# Patient Record
Sex: FEMALE | Race: WHITE | Employment: PART TIME | URBAN - METROPOLITAN AREA
[De-identification: names, ages, dates, MRNs, and addresses within clinical notes are randomized per-mention and may not be internally consistent; named-entity substitution may affect disease eponyms.]

---

## 2017-02-08 ENCOUNTER — OFFICE VISIT (OUTPATIENT)
Dept: URGENT CARE | Facility: URGENT CARE | Age: 67
End: 2017-02-08
Payer: OTHER MISCELLANEOUS

## 2017-02-08 VITALS
SYSTOLIC BLOOD PRESSURE: 120 MMHG | WEIGHT: 146 LBS | DIASTOLIC BLOOD PRESSURE: 80 MMHG | OXYGEN SATURATION: 97 % | TEMPERATURE: 98 F | HEART RATE: 68 BPM

## 2017-02-08 DIAGNOSIS — S61.219A FINGER LACERATION, INITIAL ENCOUNTER: Primary | ICD-10-CM

## 2017-02-08 PROCEDURE — 12001 RPR S/N/AX/GEN/TRNK 2.5CM/<: CPT | Performed by: FAMILY MEDICINE

## 2017-02-08 RX ORDER — ASPIRIN 81 MG/1
81 TABLET, CHEWABLE ORAL DAILY
COMMUNITY

## 2017-02-08 RX ORDER — SIMVASTATIN 20 MG
20 TABLET ORAL AT BEDTIME
COMMUNITY

## 2017-02-08 NOTE — NURSING NOTE
Chief Complaint   Patient presents with     Laceration     laceration to rt thumb,sustained at work on a      Work Comp       Initial /80 mmHg  Pulse 68  Temp(Src) 98  F (36.7  C) (Oral)  Wt 146 lb (66.225 kg)  SpO2 97% There is no height on file to calculate BMI.  Medication Reconciliation: complete   Miles HAMPTON  Regular cuff

## 2017-02-08 NOTE — PROGRESS NOTES
SUBJECTIVE: @[email or web address]@.ident who presents to the clinic with a laceration on the finger sustained 1 hours(s) ago.    This is a non-work related and accidental injury.    Mechanism of injury: sheet metal.  Associated symptoms: Denies numbness, weakness, or loss of function  Last tetanus booster within 10 years: yes    No past medical history on file.  No Known Allergies  Social History   Substance Use Topics     Smoking status: Current Every Day Smoker -- 1.00 packs/day     Types: Cigarettes     Smokeless tobacco: Never Used     Alcohol Use: Not on file       ROS:  Neuro: good distal sensation  Motor: normal rom and strenght  Hem: capillary refill < 2 sec    EXAM: The patient appears today in alert,no apparent distress distressVITALS:   /80 mmHg  Pulse 68  Temp(Src) 98  F (36.7  C) (Oral)  Wt 146 lb (66.225 kg)  SpO2 97%  Size of laceration: 1 centimeters  Characteristics of the laceration: clean and flap type  Tendon function intact: yes  Sensation to light touch intact: yes  Pulses/Capillary refill intact: yes      ICD-10-CM    1. Finger laceration, initial encounter S61.219A REPAIR SUPERFICIAL, WOUND BODY < =2.5CM       Procedure Note:Wound injected with 1 cc's of Lidocaine 1% plain  Good anesthesia was obtained  Prepped and draped in the usual sterile fashion  Wound cleaned with sterile water  Wound soaked  Laceration was closed using 2 5-0 nylon interrupted sutures  After care instructions:Keep wound clean   Sutures out in 10 days  Signs of infection discussed today

## 2017-02-08 NOTE — MR AVS SNAPSHOT
"              After Visit Summary   2017    Kiara Che    MRN: [id number]           Patient Information     Date Of Birth          1950        Visit Information        Provider Department      2017 4:15 PM Mihai Quinn, DO [facility]        Today's Diagnoses     Finger laceration, initial encounter    -  1        Follow-ups after your visit        Who to contact     If you have questions or need follow up information about today's clinic visit or your schedule please contact [facility] directly at [phone].  Normal or non-critical lab and imaging results will be communicated to you by [employer], letter or phone within 4 business days after the clinic has received the results. If you do not hear from us within 7 days, please contact the clinic through [employer] or phone. If you have a critical or abnormal lab result, we will notify you by phone as soon as possible.  Submit refill requests through [employer] or call your pharmacy and they will forward the refill request to us. Please allow 3 business days for your refill to be completed.          Additional Information About Your Visit        MyChart Information     [employer] lets you send messages to your doctor, view your test results, renew your prescriptions, schedule appointments and more. To sign up, go to [email or web address] . Click on \"Log in\" on the left side of the screen, which will take you to the Welcome page. Then click on \"Sign up Now\" on the right side of the page.     You will be asked to enter the access code listed below, as well as some personal information. Please follow the directions to create your username and password.     Your access code is: [email or web address]  Expires: 2017  4:43 PM     Your access code will  in 90 days. If you need help or a new code, please call your [place] clinic or [phone].        Care EveryWhere ID     This is your Care EveryWhere " ID. This could be used by other organizations to access your [place] medical records  [id number]        Your Vitals Were     Pulse Temperature Pulse Oximetry             68 98  F (36.7  C) (Oral) 97%          Blood Pressure from Last 3 Encounters:   02/08/17 120/80    Weight from Last 3 Encounters:   02/08/17 146 lb (66.225 kg)              We Performed the Following     REPAIR SUPERFICIAL, WOUND BODY < =2.5CM        Primary Care Provider    None Specified       No primary provider on file.        Thank you!     Thank you for choosing [facility]  for your care. Our goal is always to provide you with excellent care. Hearing back from our patients is one way we can continue to improve our services. Please take a few minutes to complete the written survey that you may receive in the mail after your visit with us. Thank you!             Your Updated Medication List - Protect others around you: Learn how to safely use, store and throw away your medicines at www.disposemymeds.org.          This list is accurate as of: 2/8/17  4:43 PM.  Always use your most recent med list.                   Brand Name Dispense Instructions for use    aspirin 81 MG chewable tablet      Take 81 mg by mouth daily       PRIMIDONE PO          simvastatin 20 MG tablet    ZOCOR     Take 20 mg by mouth At Bedtime

## 2017-02-17 ENCOUNTER — OFFICE VISIT (OUTPATIENT)
Dept: URGENT CARE | Facility: URGENT CARE | Age: 67
End: 2017-02-17

## 2017-02-17 VITALS — TEMPERATURE: 97 F

## 2017-02-17 DIAGNOSIS — Z48.02 ENCOUNTER FOR REMOVAL OF SUTURES: Primary | ICD-10-CM

## 2017-02-17 PROCEDURE — 99207 ZZC NO CHARGE NURSE ONLY: CPT

## 2017-02-17 NOTE — MR AVS SNAPSHOT
"              After Visit Summary   2017    Kiara Che    MRN: [id number]           Patient Information     Date Of Birth          1950        Visit Information        Provider Department      2017 11:15 AM Provider, Ellie Petersen MD [facility]        Today's Diagnoses     Encounter for removal of sutures    -  1       Follow-ups after your visit        Who to contact     If you have questions or need follow up information about today's clinic visit or your schedule please contact [facility] directly at [phone].  Normal or non-critical lab and imaging results will be communicated to you by [employer], letter or phone within 4 business days after the clinic has received the results. If you do not hear from us within 7 days, please contact the clinic through [employer] or phone. If you have a critical or abnormal lab result, we will notify you by phone as soon as possible.  Submit refill requests through [employer] or call your pharmacy and they will forward the refill request to us. Please allow 3 business days for your refill to be completed.          Additional Information About Your Visit        MyChart Information     [employer] lets you send messages to your doctor, view your test results, renew your prescriptions, schedule appointments and more. To sign up, go to [email or web address] . Click on \"Log in\" on the left side of the screen, which will take you to the Welcome page. Then click on \"Sign up Now\" on the right side of the page.     You will be asked to enter the access code listed below, as well as some personal information. Please follow the directions to create your username and password.     Your access code is: [email or web address]  Expires: 2017  4:43 PM     Your access code will  in 90 days. If you need help or a new code, please call your [place] clinic or [phone].        Care EveryWhere ID     This is your Care " EveryWhere ID. This could be used by other organizations to access your [place] medical records  [id number]        Your Vitals Were     Temperature                   97  F (36.1  C) (Oral)            Blood Pressure from Last 3 Encounters:   02/08/17 120/80    Weight from Last 3 Encounters:   02/08/17 146 lb (66.2 kg)              Today, you had the following     No orders found for display       Primary Care Provider    None Specified       No primary provider on file.        Thank you!     Thank you for choosing [facility]  for your care. Our goal is always to provide you with excellent care. Hearing back from our patients is one way we can continue to improve our services. Please take a few minutes to complete the written survey that you may receive in the mail after your visit with us. Thank you!             Your Updated Medication List - Protect others around you: Learn how to safely use, store and throw away your medicines at www.disposemymeds.org.          This list is accurate as of: 2/17/17 11:26 AM.  Always use your most recent med list.                   Brand Name Dispense Instructions for use    aspirin 81 MG chewable tablet      Take 81 mg by mouth daily       PRIMIDONE PO          simvastatin 20 MG tablet    ZOCOR     Take 20 mg by mouth At Bedtime

## 2017-02-17 NOTE — NURSING NOTE
Kiara presents to the clinic today for  removal of sutures and suture.  The patient has had the sutures and suture in place for 10 days.    There has been no history of infection or drainage.    O: 1 sutures and suture are seen located on the rt thumb.  The wound is healing well with no signs of infection.    Tetanus status is up to date.    A: Suture removal.    P:  All sutures were easily removed today.  Routine wound care discussed.  The patient will follow up as needed.  Order to remover per Sharmin Aquino LPN

## 2020-04-06 ENCOUNTER — HOSPITAL ENCOUNTER (EMERGENCY)
Facility: CLINIC | Age: 70
Discharge: HOME | End: 2020-04-06
Attending: NURSE PRACTITIONER | Admitting: NURSE PRACTITIONER
Payer: MEDICARE

## 2020-04-06 ENCOUNTER — APPOINTMENT (OUTPATIENT)
Dept: GENERAL RADIOLOGY | Facility: CLINIC | Age: 70
End: 2020-04-06
Attending: EMERGENCY MEDICINE
Payer: MEDICARE

## 2020-04-06 VITALS
TEMPERATURE: 98.2 F | RESPIRATION RATE: 18 BRPM | OXYGEN SATURATION: 96 % | HEART RATE: 89 BPM | SYSTOLIC BLOOD PRESSURE: 127 MMHG | DIASTOLIC BLOOD PRESSURE: 86 MMHG

## 2020-04-06 DIAGNOSIS — M79.672 LEFT FOOT PAIN: ICD-10-CM

## 2020-04-06 PROCEDURE — 25000132 ZZH RX MED GY IP 250 OP 250 PS 637: Mod: GY | Performed by: NURSE PRACTITIONER

## 2020-04-06 PROCEDURE — 99284 EMERGENCY DEPT VISIT MOD MDM: CPT

## 2020-04-06 PROCEDURE — 73630 X-RAY EXAM OF FOOT: CPT | Mod: LT

## 2020-04-06 RX ORDER — ACETAMINOPHEN 500 MG
1000 TABLET ORAL ONCE
Status: COMPLETED | OUTPATIENT
Start: 2020-04-06 | End: 2020-04-06

## 2020-04-06 RX ADMIN — ACETAMINOPHEN 1000 MG: 500 TABLET, FILM COATED ORAL at 16:26

## 2020-04-06 ASSESSMENT — ENCOUNTER SYMPTOMS: ARTHRALGIAS: 0

## 2020-04-06 NOTE — ED PROVIDER NOTES
History     Chief Complaint:  Foot Pain      HPI   Kiara Che is a 69 year old female smoker who presents with left foot pain after tripping and falling while chasing her dog around 1130 this morning. The patient reported that she had shoes on when this occurred. Her pain is on the lateral aspect of the left foot only. Since then she has been able to bear weight on her foot but it has been painful.    Allergies:  The patient has no known drug allergies.    Medications:    Aspirin 81   Primidone   Zocor    Lyrica    Past Medical History:    The patient has no significant past medical history on file.    Past Surgical History:    The patient does not have any pertinent past surgical history.    Family History:    No past pertinent family history.    Social History:  Current everyday smoker   2-3 drinks/week   Negative for drug use.  Marital Status:   [2]    Review of Systems   Musculoskeletal: Negative for arthralgias (ankle).        Foot pain   All other systems reviewed and are negative.    Physical Exam     Patient Vitals for the past 24 hrs:   BP Temp Temp src Heart Rate Resp SpO2   04/06/20 1550 123/76 98.2  F (36.8  C) Oral 88 18 96 %       Physical Exam  Nursing notes reviewed. Vitals reviewed.  General: Alert. mild discomfort . Well kept.  Eyes: Conjunctiva non-injected, non-icteric.  Neck/Throat: Moist mucous membranes. Normal voice.  Cardiac: Regular rhythm. Normal heart sounds. 2+ pulses on left DP. Normal capillary refill.  Pulmonary: Clear and equal breath sounds bilaterally.   Musculoskeletal:   Left lower extremity: No foot deformity or swelling. No tenderness to palpation over dorsum of foot. Able to flex and extend toes. No obvious ankle deformity. No pain over the proximal tibia/fibula, bilateral malleolus, deltoid ligament, Lis-Franc joint.  She does have mild pain over proximal 5th metatarsal. Full knee flexion and extension intact without difficulty. No knee tenderness to palpation.    Neurological: Alert and oriented x4. 5/5 strength bilateral lower extremity. Distal sensation intact.  Skin: No laceration or ecchymosis to affected extremity.   Psych: Affect normal. Good eye contact.    Emergency Department Course   Imaging:  Radiographic findings were communicated with the patient who voiced understanding of the findings.    XR Foot 3 views, Left:   Osteopenia. No fractures are evident. Normal joint spacing   and alignment, as per radiology.     Interventions:  1626 Tylenol 1g PO     Emergency Department Course:  Nursing notes and vitals reviewed. (1620) I performed an exam of the patient as documented above.     IV inserted. Medicine administered as documented above. Blood drawn. This was sent to the lab for further testing, results above.    The patient was sent for a Xray while in the emergency department, findings above.     (1620) I rechecked the patient and discussed the results of her workup thus far.     Findings and plan explained to the Patient. Patient discharged home with instructions regarding supportive care, medications, and reasons to return. The importance of close follow-up was reviewed.     I personally reviewed the laboratory results with the Patient and answered all related questions prior to discharge.      Impression & Plan    Medical Decision Making:  Kiara Che is a 69 year old female is a 69 year old year old female who presents to the emergency department with a recent onset of left foot pain.  Vital signs as above.  Differential diagnosis includes, but is not limited to ankle sprain, fracture, dislocation, syndesmotic injury, achilles tendon rupture, talar dome injury, midfoot injury (lisfranc injury), and talar injury.    Based on the history, mechanism of injury, examination findings and ED evaluation, the patient's pain is most likely felt to be related to foot sprain versus contusion.  XR was obtained, as above, without evidence of acute fracture or  dislocation.   Prabhakar testing is negative, and Achilles tendon is non-tender to palpation without appreciable defect to suggest tendinous rupture.  Additionally, there is no evidence of neurovascular compromise distally.    We reviewed that swelling acute after the injury sometimes limits accurate assessment of the extent of the injury, and for that reason, have recommended close follow-up with their primary care provider.  We discussed appropriate treatment instructions include rest, ice, elevation, compression, and acetaminiophen for pain.  Patient will be placed in a hard soled post-op cast she for immobilization.  Patient was encouraged to perform simple range of motion exercises while at home, such as spelling out the alphabet with their toes and moving at the ankle.  They were encouraged to return to the ER with worsening pain, swelling, inability to bear weight, or any other new or troubling symptoms.    Diagnosis:    ICD-10-CM    1. Left foot pain  M79.672      Disposition:  discharged to home    Scribe Disclosure:  IFeli, am serving as a scribe on 4/6/2020 at 4:20 PM to personally document services performed by Nimisha Sena DNP based on my observations and the provider's statements to me.     Feli Martinez  4/6/2020    EMERGENCY DEPARTMENT       Nimisha Sena CNP  04/06/20 [id number]

## 2020-04-06 NOTE — LETTER
April 6, 2020      To Whom It May Concern:      Kiara Che was seen in our Emergency Department today, 04/06/20. Please excuse Kiara from work on 4/10/2020 due to injury.  She may return to work when improved.    Sincerely,        Nimisha Sena, CNP

## 2020-04-06 NOTE — ED AVS SNAPSHOT
Emergency Department  [address] [facility] [address]  Phone:  [phone]  Fax:  [phone]                                    Kiara Che   MRN: [id number]    Department:   Emergency Department   Date of Visit:  4/6/2020           After Visit Summary Signature Page    I have received my discharge instructions, and my questions have been answered. I have discussed any challenges I see with this plan with the nurse or doctor.    ..........................................................................................................................................  Patient/Patient Representative Signature      ..........................................................................................................................................  Patient Representative Print Name and Relationship to Patient    ..................................................               ................................................  Date                                   Time    ..........................................................................................................................................  Reviewed by Signature/Title    ...................................................              ..............................................  Date                                               Time          22EPIC Rev 08/18

## 2022-03-10 ENCOUNTER — APPOINTMENT (OUTPATIENT)
Dept: URBAN - METROPOLITAN AREA CLINIC 256 | Age: 72
Setting detail: DERMATOLOGY
End: 2022-03-12

## 2022-03-10 VITALS — HEIGHT: 66 IN | RESPIRATION RATE: 15 BRPM | WEIGHT: 141 LBS

## 2022-03-10 DIAGNOSIS — L72.0 EPIDERMAL CYST: ICD-10-CM

## 2022-03-10 DIAGNOSIS — L82.1 OTHER SEBORRHEIC KERATOSIS: ICD-10-CM

## 2022-03-10 DIAGNOSIS — L24 IRRITANT CONTACT DERMATITIS: ICD-10-CM

## 2022-03-10 PROBLEM — L24.9 IRRITANT CONTACT DERMATITIS, UNSPECIFIED CAUSE: Status: ACTIVE | Noted: 2022-03-10

## 2022-03-10 PROCEDURE — OTHER INTRALESIONAL KENALOG: OTHER

## 2022-03-10 PROCEDURE — OTHER REASSURANCE: OTHER

## 2022-03-10 PROCEDURE — OTHER PRESCRIPTION: OTHER

## 2022-03-10 PROCEDURE — 99203 OFFICE O/P NEW LOW 30 MIN: CPT | Mod: 25

## 2022-03-10 PROCEDURE — OTHER COUNSELING: OTHER

## 2022-03-10 PROCEDURE — OTHER MIPS QUALITY: OTHER

## 2022-03-10 PROCEDURE — OTHER INCISION AND DRAINAGE: OTHER

## 2022-03-10 PROCEDURE — 11900 INJECT SKIN LESIONS </W 7: CPT

## 2022-03-10 PROCEDURE — 10060 I&D ABSCESS SIMPLE/SINGLE: CPT

## 2022-03-10 RX ORDER — TRIAMCINOLONE ACETONIDE 1 MG/G
OINTMENT TOPICAL QHS
Qty: 80 | Refills: 1 | Status: ERX | COMMUNITY
Start: 2022-03-10

## 2022-03-10 ASSESSMENT — LOCATION DETAILED DESCRIPTION DERM
LOCATION DETAILED: LEFT CENTRAL BUCCAL CHEEK
LOCATION DETAILED: RIGHT ULNAR DORSAL HAND
LOCATION DETAILED: LEFT AXILLARY VAULT

## 2022-03-10 ASSESSMENT — LOCATION SIMPLE DESCRIPTION DERM
LOCATION SIMPLE: LEFT CHEEK
LOCATION SIMPLE: RIGHT HAND
LOCATION SIMPLE: LEFT AXILLARY VAULT

## 2022-03-10 ASSESSMENT — LOCATION ZONE DERM
LOCATION ZONE: FACE
LOCATION ZONE: HAND
LOCATION ZONE: AXILLAE

## 2022-03-10 NOTE — PROCEDURE: INCISION AND DRAINAGE
Post-Care Instructions: I reviewed with the patient in detail post-care instructions. Patient should keep wound covered and call the office should any redness, pain, swelling or worsening occur.
Suture Text: The incision was partially closed with
Drainage Amount?: minimal
Method: 11 blade
Drainage Type?: cyst-like
Consent was obtained and risks were reviewed including but not limited to delayed wound healing, infection, need for multiple I and D's, and pain.
Detail Level: Detailed
Size Of Lesion In Cm (Optional But May Be Required For Some Insurances): 0
Curette: Yes
Preparation Text: The area was prepped in the usual clean fashion.
Epidermal Sutures: 4-0 Ethilon
Anesthesia Type: 1% lidocaine with epinephrine
Dressing: Band-Aid
Include Sutures?: No
Curette Text (Optional): After the contents were expressed a curette was used to partially remove the cyst wall.
Lesion Type: Abscess
Epidermal Closure: simple interrupted

## 2022-03-10 NOTE — PROCEDURE: INTRALESIONAL KENALOG
Kenalog Preparation: Kenalog
Include Z78.9 (Other Specified Conditions Influencing Health Status) As An Associated Diagnosis?: No
Detail Level: Detailed
Total Volume Injected (Ccs- Only Use Numbers And Decimals): .4
Validate Note Data When Using Inventory: Yes
Consent: The risks of atrophy were reviewed with the patient.
Medical Necessity Clause: This procedure was medically necessary because the lesions that were treated were:
Concentration Of Solution Injected (Mg/Ml): 2.0
X Size Of Lesion In Cm (Optional): 0